# Patient Record
Sex: MALE | Race: ASIAN | NOT HISPANIC OR LATINO | URBAN - METROPOLITAN AREA
[De-identification: names, ages, dates, MRNs, and addresses within clinical notes are randomized per-mention and may not be internally consistent; named-entity substitution may affect disease eponyms.]

---

## 2022-02-08 ENCOUNTER — EMERGENCY (EMERGENCY)
Facility: HOSPITAL | Age: 22
LOS: 1 days | Discharge: HOME | End: 2022-02-08
Attending: EMERGENCY MEDICINE | Admitting: EMERGENCY MEDICINE
Payer: MEDICAID

## 2022-02-08 VITALS
TEMPERATURE: 99 F | OXYGEN SATURATION: 100 % | HEART RATE: 109 BPM | RESPIRATION RATE: 16 BRPM | DIASTOLIC BLOOD PRESSURE: 96 MMHG | SYSTOLIC BLOOD PRESSURE: 141 MMHG

## 2022-02-08 PROCEDURE — 93010 ELECTROCARDIOGRAM REPORT: CPT

## 2022-02-08 PROCEDURE — 99285 EMERGENCY DEPT VISIT HI MDM: CPT | Mod: 25

## 2022-02-08 NOTE — ED ADULT TRIAGE NOTE - CHIEF COMPLAINT QUOTE
pt with lymphoma started on chemo a few days ago. pt c/o non radiating left sided chest pain, palpitations and a headache beginning today. denies sob, cough, fever, n/v. pt appears to be in no distress.

## 2022-02-09 LAB
ALBUMIN SERPL ELPH-MCNC: 4.4 G/DL — SIGNIFICANT CHANGE UP (ref 3.3–5)
ALP SERPL-CCNC: 85 U/L — SIGNIFICANT CHANGE UP (ref 40–120)
ALT FLD-CCNC: 59 U/L — HIGH (ref 4–41)
ANION GAP SERPL CALC-SCNC: 9 MMOL/L — SIGNIFICANT CHANGE UP (ref 7–14)
APPEARANCE UR: CLEAR — SIGNIFICANT CHANGE UP
AST SERPL-CCNC: 45 U/L — HIGH (ref 4–40)
B PERT DNA SPEC QL NAA+PROBE: SIGNIFICANT CHANGE UP
B PERT+PARAPERT DNA PNL SPEC NAA+PROBE: SIGNIFICANT CHANGE UP
BACTERIA # UR AUTO: NEGATIVE — SIGNIFICANT CHANGE UP
BASOPHILS # BLD AUTO: 0.02 K/UL — SIGNIFICANT CHANGE UP (ref 0–0.2)
BASOPHILS NFR BLD AUTO: 0.2 % — SIGNIFICANT CHANGE UP (ref 0–2)
BILIRUB SERPL-MCNC: 0.6 MG/DL — SIGNIFICANT CHANGE UP (ref 0.2–1.2)
BILIRUB UR-MCNC: NEGATIVE — SIGNIFICANT CHANGE UP
BORDETELLA PARAPERTUSSIS (RAPRVP): SIGNIFICANT CHANGE UP
BUN SERPL-MCNC: 13 MG/DL — SIGNIFICANT CHANGE UP (ref 7–23)
C PNEUM DNA SPEC QL NAA+PROBE: SIGNIFICANT CHANGE UP
CALCIUM SERPL-MCNC: 9.5 MG/DL — SIGNIFICANT CHANGE UP (ref 8.4–10.5)
CHLORIDE SERPL-SCNC: 101 MMOL/L — SIGNIFICANT CHANGE UP (ref 98–107)
CK SERPL-CCNC: 34 U/L — SIGNIFICANT CHANGE UP (ref 30–200)
CO2 SERPL-SCNC: 27 MMOL/L — SIGNIFICANT CHANGE UP (ref 22–31)
COLOR SPEC: SIGNIFICANT CHANGE UP
CREAT SERPL-MCNC: 0.78 MG/DL — SIGNIFICANT CHANGE UP (ref 0.5–1.3)
DIFF PNL FLD: ABNORMAL
EOSINOPHIL # BLD AUTO: 0.06 K/UL — SIGNIFICANT CHANGE UP (ref 0–0.5)
EOSINOPHIL NFR BLD AUTO: 0.7 % — SIGNIFICANT CHANGE UP (ref 0–6)
FLUAV SUBTYP SPEC NAA+PROBE: SIGNIFICANT CHANGE UP
FLUBV RNA SPEC QL NAA+PROBE: SIGNIFICANT CHANGE UP
GLUCOSE SERPL-MCNC: 100 MG/DL — HIGH (ref 70–99)
GLUCOSE UR QL: NEGATIVE — SIGNIFICANT CHANGE UP
HADV DNA SPEC QL NAA+PROBE: SIGNIFICANT CHANGE UP
HCOV 229E RNA SPEC QL NAA+PROBE: SIGNIFICANT CHANGE UP
HCOV HKU1 RNA SPEC QL NAA+PROBE: SIGNIFICANT CHANGE UP
HCOV NL63 RNA SPEC QL NAA+PROBE: SIGNIFICANT CHANGE UP
HCOV OC43 RNA SPEC QL NAA+PROBE: SIGNIFICANT CHANGE UP
HCT VFR BLD CALC: 42.4 % — SIGNIFICANT CHANGE UP (ref 39–50)
HGB BLD-MCNC: 14.3 G/DL — SIGNIFICANT CHANGE UP (ref 13–17)
HMPV RNA SPEC QL NAA+PROBE: SIGNIFICANT CHANGE UP
HPIV1 RNA SPEC QL NAA+PROBE: SIGNIFICANT CHANGE UP
HPIV2 RNA SPEC QL NAA+PROBE: SIGNIFICANT CHANGE UP
HPIV3 RNA SPEC QL NAA+PROBE: SIGNIFICANT CHANGE UP
HPIV4 RNA SPEC QL NAA+PROBE: SIGNIFICANT CHANGE UP
IANC: 7.22 K/UL — SIGNIFICANT CHANGE UP (ref 1.5–8.5)
IMM GRANULOCYTES NFR BLD AUTO: 0.4 % — SIGNIFICANT CHANGE UP (ref 0–1.5)
KETONES UR-MCNC: NEGATIVE — SIGNIFICANT CHANGE UP
LDH SERPL L TO P-CCNC: 162 U/L — SIGNIFICANT CHANGE UP (ref 135–225)
LEUKOCYTE ESTERASE UR-ACNC: NEGATIVE — SIGNIFICANT CHANGE UP
LYMPHOCYTES # BLD AUTO: 0.79 K/UL — LOW (ref 1–3.3)
LYMPHOCYTES # BLD AUTO: 9.7 % — LOW (ref 13–44)
M PNEUMO DNA SPEC QL NAA+PROBE: SIGNIFICANT CHANGE UP
MAGNESIUM SERPL-MCNC: 2.1 MG/DL — SIGNIFICANT CHANGE UP (ref 1.6–2.6)
MCHC RBC-ENTMCNC: 28 PG — SIGNIFICANT CHANGE UP (ref 27–34)
MCHC RBC-ENTMCNC: 33.7 GM/DL — SIGNIFICANT CHANGE UP (ref 32–36)
MCV RBC AUTO: 83 FL — SIGNIFICANT CHANGE UP (ref 80–100)
MONOCYTES # BLD AUTO: 0.04 K/UL — SIGNIFICANT CHANGE UP (ref 0–0.9)
MONOCYTES NFR BLD AUTO: 0.5 % — LOW (ref 2–14)
NEUTROPHILS # BLD AUTO: 7.22 K/UL — SIGNIFICANT CHANGE UP (ref 1.8–7.4)
NEUTROPHILS NFR BLD AUTO: 88.5 % — HIGH (ref 43–77)
NITRITE UR-MCNC: NEGATIVE — SIGNIFICANT CHANGE UP
NRBC # BLD: 0 /100 WBCS — SIGNIFICANT CHANGE UP
NRBC # FLD: 0 K/UL — SIGNIFICANT CHANGE UP
PH UR: 6.5 — SIGNIFICANT CHANGE UP (ref 5–8)
PHOSPHATE SERPL-MCNC: 3.7 MG/DL — SIGNIFICANT CHANGE UP (ref 2.5–4.5)
PLATELET # BLD AUTO: 150 K/UL — SIGNIFICANT CHANGE UP (ref 150–400)
POTASSIUM SERPL-MCNC: 4.3 MMOL/L — SIGNIFICANT CHANGE UP (ref 3.5–5.3)
POTASSIUM SERPL-SCNC: 4.3 MMOL/L — SIGNIFICANT CHANGE UP (ref 3.5–5.3)
PROCALCITONIN SERPL-MCNC: 0.04 NG/ML — SIGNIFICANT CHANGE UP (ref 0.02–0.1)
PROT SERPL-MCNC: 7.3 G/DL — SIGNIFICANT CHANGE UP (ref 6–8.3)
PROT UR-MCNC: NEGATIVE — SIGNIFICANT CHANGE UP
RAPID RVP RESULT: SIGNIFICANT CHANGE UP
RBC # BLD: 5.11 M/UL — SIGNIFICANT CHANGE UP (ref 4.2–5.8)
RBC # FLD: 12.3 % — SIGNIFICANT CHANGE UP (ref 10.3–14.5)
RBC CASTS # UR COMP ASSIST: 1 /HPF — SIGNIFICANT CHANGE UP (ref 0–4)
RSV RNA SPEC QL NAA+PROBE: SIGNIFICANT CHANGE UP
RV+EV RNA SPEC QL NAA+PROBE: SIGNIFICANT CHANGE UP
SARS-COV-2 RNA SPEC QL NAA+PROBE: SIGNIFICANT CHANGE UP
SODIUM SERPL-SCNC: 137 MMOL/L — SIGNIFICANT CHANGE UP (ref 135–145)
SP GR SPEC: 1.01 — SIGNIFICANT CHANGE UP (ref 1–1.05)
TROPONIN T, HIGH SENSITIVITY RESULT: <6 NG/L — SIGNIFICANT CHANGE UP
URATE SERPL-MCNC: 3.4 MG/DL — SIGNIFICANT CHANGE UP (ref 3.4–8.8)
UROBILINOGEN FLD QL: SIGNIFICANT CHANGE UP
WBC # BLD: 8.16 K/UL — SIGNIFICANT CHANGE UP (ref 3.8–10.5)
WBC # FLD AUTO: 8.16 K/UL — SIGNIFICANT CHANGE UP (ref 3.8–10.5)
WBC UR QL: 1 /HPF — SIGNIFICANT CHANGE UP (ref 0–5)

## 2022-02-09 PROCEDURE — 71046 X-RAY EXAM CHEST 2 VIEWS: CPT | Mod: 26

## 2022-02-09 PROCEDURE — 99220: CPT | Mod: 25

## 2022-02-09 PROCEDURE — 99284 EMERGENCY DEPT VISIT MOD MDM: CPT

## 2022-02-09 PROCEDURE — 70450 CT HEAD/BRAIN W/O DYE: CPT | Mod: 26,MA

## 2022-02-09 RX ORDER — SODIUM CHLORIDE 9 MG/ML
1000 INJECTION INTRAMUSCULAR; INTRAVENOUS; SUBCUTANEOUS ONCE
Refills: 0 | Status: COMPLETED | OUTPATIENT
Start: 2022-02-09 | End: 2022-02-09

## 2022-02-09 RX ORDER — ACETAMINOPHEN 500 MG
975 TABLET ORAL ONCE
Refills: 0 | Status: COMPLETED | OUTPATIENT
Start: 2022-02-09 | End: 2022-02-09

## 2022-02-09 RX ORDER — KETOROLAC TROMETHAMINE 30 MG/ML
15 SYRINGE (ML) INJECTION ONCE
Refills: 0 | Status: DISCONTINUED | OUTPATIENT
Start: 2022-02-09 | End: 2022-02-09

## 2022-02-09 RX ORDER — DIPHENHYDRAMINE HCL 50 MG
25 CAPSULE ORAL ONCE
Refills: 0 | Status: COMPLETED | OUTPATIENT
Start: 2022-02-09 | End: 2022-02-09

## 2022-02-09 RX ORDER — MAGNESIUM SULFATE 500 MG/ML
1 VIAL (ML) INJECTION ONCE
Refills: 0 | Status: COMPLETED | OUTPATIENT
Start: 2022-02-09 | End: 2022-02-09

## 2022-02-09 RX ORDER — METOCLOPRAMIDE HCL 10 MG
10 TABLET ORAL ONCE
Refills: 0 | Status: COMPLETED | OUTPATIENT
Start: 2022-02-09 | End: 2022-02-09

## 2022-02-09 RX ORDER — DIAZEPAM 5 MG
2 TABLET ORAL ONCE
Refills: 0 | Status: DISCONTINUED | OUTPATIENT
Start: 2022-02-09 | End: 2022-02-09

## 2022-02-09 RX ADMIN — Medication 25 MILLIGRAM(S): at 09:23

## 2022-02-09 RX ADMIN — SODIUM CHLORIDE 1000 MILLILITER(S): 9 INJECTION INTRAMUSCULAR; INTRAVENOUS; SUBCUTANEOUS at 02:10

## 2022-02-09 RX ADMIN — Medication 15 MILLIGRAM(S): at 07:25

## 2022-02-09 RX ADMIN — Medication 10 MILLIGRAM(S): at 09:23

## 2022-02-09 RX ADMIN — Medication 15 MILLIGRAM(S): at 03:15

## 2022-02-09 RX ADMIN — Medication 975 MILLIGRAM(S): at 05:24

## 2022-02-09 RX ADMIN — Medication 10 MILLIGRAM(S): at 05:24

## 2022-02-09 RX ADMIN — Medication 15 MILLIGRAM(S): at 14:44

## 2022-02-09 RX ADMIN — Medication 975 MILLIGRAM(S): at 06:30

## 2022-02-09 RX ADMIN — SODIUM CHLORIDE 1000 MILLILITER(S): 9 INJECTION INTRAMUSCULAR; INTRAVENOUS; SUBCUTANEOUS at 09:23

## 2022-02-09 RX ADMIN — Medication 15 MILLIGRAM(S): at 19:50

## 2022-02-09 RX ADMIN — Medication 10 MILLIGRAM(S): at 18:21

## 2022-02-09 RX ADMIN — Medication 2 MILLIGRAM(S): at 07:25

## 2022-02-09 RX ADMIN — Medication 15 MILLIGRAM(S): at 02:24

## 2022-02-09 RX ADMIN — Medication 100 GRAM(S): at 06:42

## 2022-02-09 RX ADMIN — Medication 100 GRAM(S): at 09:22

## 2022-02-09 RX ADMIN — Medication 15 MILLIGRAM(S): at 14:59

## 2022-02-09 RX ADMIN — Medication 15 MILLIGRAM(S): at 19:20

## 2022-02-09 NOTE — ED PROVIDER NOTE - NSFOLLOWUPINSTRUCTIONS_ED_ALL_ED_FT
You came to the hospital for severe headache. You obtained some imaging and lab work that rule out an infection or a condition called tumor lysis syndrome. Your headache was controlled with some NSAIDs. You can continue to take NSAIDs as necessary however you should stay within the recommended amounts. Please follow up with your PCP and oncologist who should recheck your liver enzymes.   Please return to the ED if the symptoms become uncontrolled or you develop any other concerning symptoms. You came to the hospital for severe headache. Your Head CT, blood work, EKG, and urine test were reassuring.    You may take Tylenol and/or Motrin over the counter per label instructions as needed for pain.    Please follow up with your oncology within 1 week to discuss your ER visit.    Please return to the ED if the symptoms worsen or you develop any other concerning symptoms.

## 2022-02-09 NOTE — ED PROVIDER NOTE - PROGRESS NOTE DETAILS
Sarwta, PGY3 - received pt as sign-out, pending CTH read. Pt reevaluated, states headache initially 10/10, improved with Toradol but starting to come back, currently 7/10. Will give Tylenol (last had 14hrs ago) and Reglan, reeval ALONDRA Horn: Case endorsed. Pt reassessed, states headache is 6/10. Will cdu for intractable headache, and MRI.

## 2022-02-09 NOTE — CONSULT NOTE ADULT - ATTENDING COMMENTS
22 y/o man with recent diagnosis of Hodgkin's lymphoma and recently started on Chemo p/w new onset headache. No findings on his exam. HE received magnesium, tylenol, toradol, reglan, and benadryl for pain with some improvement. Discussed with his oncologist he started having these headaches after getting chemo. They tried to control in the center but he came in to the ED .   Could be side effect of chemo vs new onset of migraine. Also in ddx is carcinomatous meningitis but unlikley as Hodgkin's rarely spreads to the brain.    Agree with CDU admission for MRI w/wo  for pain: IV tylenol 1g, IV fluids, Toradol 30mg IV q6 PRN

## 2022-02-09 NOTE — ED PROVIDER NOTE - PHYSICAL EXAMINATION
PHYSICAL EXAM  GENERAL: NAD, lying comfortably in bed   HEAD:  Atraumatic, Normocephalic  EYES: EOMI b/l, PERRLA b/l, conjunctiva and sclera clear  NECK: Supple, No JVD, No LAD   CHEST/LUNG: Clear to auscultation bilaterally; No wheeze or ronchi  HEART: Regular rate and rhythm; S1 and S2 present, No murmurs, rubs, or gallops  ABDOMEN: Soft, Nontender, Nondistended; Bowel sounds present  EXTREMITIES:  2+ Peripheral Pulses, No clubbing, cyanosis, or edema  NEURO: AAOx3, non-focal, no nuchal rigidity  SKIN: No rashes or lesions  right anterior medport without overt signs of infection

## 2022-02-09 NOTE — ED ADULT NURSE NOTE - OBJECTIVE STATEMENT
pt received to room 11, a&ox 4 and ambulatory , pmh of lymphoma and recently started on Chemo, p/w headache x 1 day. Described HA as worsening. Denies photophobia, blurry vision. Verbalized chest pain radiating to left arm.  Pt breathing even and unlabored on room air. NSR on cardiac monitor. Denies fever, chills, cough, SOB, palpitations, dizziness. CT pending. endorse to covering RN. Stretcher in lowest position, wheels locked, appropriate side rails in place, call bell in reach.

## 2022-02-09 NOTE — ED PROVIDER NOTE - CLINICAL SUMMARY MEDICAL DECISION MAKING FREE TEXT BOX
22 yo M with Lymphoma recently started chemo p/f 1 day of severe headache with bodyaches pending workup for possible TLS or infectious source and control of headache 22 yo M with Lymphoma recently started chemo p/f 1 day of severe headache with body aches with negative workup for TLS or infectious source and headache now controlled with negative neuro exam and non acute imaging

## 2022-02-09 NOTE — CONSULT NOTE ADULT - ASSESSMENT
Demond Bear is a 21 year old RH male with a past medical history of lymphoma (unknown type and patient unsure) who was recently started on chemotherapy (unknown medication and patient does not know which kind) 3 days ago who is presenting with a headache.     Impression: holocephalic headache with no lateralizing deficits in the setting of recent started chemotherapy more likely secondary to medication effect than an intracranial process    Recs:  [] headache control with Toradol 30mg IV, Reglan 10mg IV, and Benadryl 25mg IV all to be administered at the same time  [] if using magnesium please use Mg 2g IV  [] can trial Solumedrol 250mg IV once  [] encourage oral hydration and provide IV hydration  [] will determine utility of further imaging upon reevaluating the patient after medications  [] upon stability can follow up with outpatient neurology at [address] [place] [address] ([phone])    Case to be seen and discussed with neurology attending, Dr. Urena.

## 2022-02-09 NOTE — ED CDU PROVIDER INITIAL DAY NOTE - ATTENDING CONTRIBUTION TO CARE
The patient is a 21y Male who has a past medical and surgery history of Lymphoma (?type)  PTED with   Vital Signs Stable Afebrile  PE: as described; my additions and exceptions are noted in the chart    DATA:  LAB:                       14.3   8.16  )-----------( 150      ( 2022 02:35 )             42.4   Mean Cell Volume: 83.0 fL (22 @ 02:35)  Auto Neutrophil %: 88.5 % (22 @ 02:35)  Auto Eosinophil %: 0.7 % (22 @ 02:35)      137  |  101  |  13  ----------------------------<  100<H>  4.3   |  27  |  0.78    Ca    9.5      2022 02:35  Phos  3.7       Mg     2.10         TPro  7.3  /  Alb  4.4  /  TBili  0.6  /  DBili  x   /  AST  45<H>  /  ALT  59<H>  /  AlkPhos  85  02   CARDIAC MARKERS ( 2022 02:35 )  x     / x     / 34 U/L / x     / x        Troponin T, High Sensitivity Result: <6 ng/L (22 @ 02:35)  Urinalysis Basic - ( 2022 02:35 )  Color: Light Yellow / Appearance: Clear / S.011 / pH: x  Gluc: x / Ketone: Negative  / Bili: Negative / Urobili: <2 mg/dL   Blood: x / Protein: Negative / Nitrite: Negative   Leuk Esterase: Negative / RBC: 1 /HPF / WBC 1 /HPF   Sq Epi: x / Non Sq Epi: x / Bacteria: Negative    CT IMPRESSION: NO bleed mass shift; recc contrast enhanced MRI for > s/specificity for occult metastatic disease or neoplasm.    IMPRESSION/RISK:  Dx=Pt with persistent HA     Consideration include: Drug induced headache vs pachymeningeal infiltration of meninges possible with some lymphoma types   Plan  Recs:  Pt received Toradol 30mg IV, Reglan 10mg IV, and Benadryl 25mg IV magnesium with best response being 6/10   will admit to CDU for MRI w/wo neuro followup and meds until pain controlled .

## 2022-02-09 NOTE — CONSULT NOTE ADULT - SUBJECTIVE AND OBJECTIVE BOX
HPI:  Demond Bear is a 21 year old RH male with a past medical history of lymphoma (unknown type and patient unsure) who was recently started on chemotherapy (unknown medication and patient does not know which kind) 3 days ago who is presenting with a headache. At baseline, the patient is ambulatory without assistive devices and is oriented to person, place, time. Stated he had a biopsy done prior to chemotherapy but is unsure of the stage. Stated that he had his first dose of chemotherapy 3 days ago through an infusion in a medport. Yesterday, the patient developed a holocephalic headache which he describes as diffuse, pressure-like and maximal 10/10. States headache is continuous. There is no positional component to his headache and not associated with photophobia, phonophobia, vertigo, lightheadedness, nausea, vomiting, visual deficits, auditory changes. States he has some neck pain as well but no rigidity or stiffness. Denies fevers, chills, numbness, tingling, weakness, gait instability. No recent falls, head trauma, recent illnesses, sick contacts. At home stated he had been taking Tylenol for his headaches with minimal effect. Here has received toradol, Tylenol, Reglan, Mg 1g with minimal effect improving his headache to at best 7/10. Also notes that he feels that he has some swelling of his tongue and throat. Stated that he has never had a headache like this in the past.     NIHSS: 0  MRS: 0    REVIEW OF SYSTEMS    A 10-system ROS was performed and is negative except for those items noted above and/or in the HPI.    PAST MEDICAL & SURGICAL HISTORY:  Lymphoma    No significant past surgical history    FAMILY HISTORY:  No pertinent family history in first degree relatives    SOCIAL HISTORY:   T/E/D: no reported smoking, alcohol, illicit drugs  Lives with family    ALLERGIES/INTOLERANCES:  Allergies  No Known Allergies    Intolerances    VITALS & EXAMINATION:  Vital Signs Last 24 Hrs  T(C): 36.7 (2022 06:01), Max: 37.1 (2022 23:16)  T(F): 98 (2022 06:01), Max: 98.7 (2022 23:16)  HR: 72 (2022 06:01) (72 - 109)  BP: 122/75 (2022 06:01) (122/75 - 145/104)  BP(mean): --  RR: 16 (2022 06:01) (16 - 16)  SpO2: 98% (2022 06:01) (98% - 100%)    General:  Constitutional: Appears stated age.  Head: Normocephalic & atraumatic.    Neurological (>12):  MS: Awake, alert, oriented to person, place, situation, time. Normal affect. Follows all commands.    Language: Speech is clear, fluent with good comprehension.    CNs: PERRL (R = 3mm, L = 3mm). VF intact in all 4 quadrants. EOMI no nystagmus, no diplopia, no pain with eye movements. V1-3 intact to LT, well developed masseter muscles b/l. No facial asymmetry b/l, full eye closure strength b/l. Symmetric palate elevation in midline. Shoulder shrug intact b/l. Tongue midline, normal movements, no atrophy.    Motor: Normal muscle bulk & tone. No noticeable tremor or seizure. No pronator drift.              Deltoid	Biceps	Triceps	   R	5	5	5	5	  L	5	5	5	5	    	H-Flex	H-Ext	K-Flex	K-Ext	D-Flex	P-Flex  R	5	5	5	5	5	5		   L	5	5	5	5	5	5		     Sensation: Intact to LT b/l throughout.     Reflexes:              Biceps(C5)       BR(C6)     Triceps(C7)               Patellar(L4)    Achilles(S1)     R	2	          2	             2		        2		    2		  L	2	          2	             2		        2		    2		    Coordination:  No dysmetria to FTN    Gait: No postural instability. Normal stance and gait.     LABORATORY:  CBC                       14.3   8.16  )-----------( 150      ( 2022 02:35 )             42.4     Chem 02-    137  |  101  |  13  ----------------------------<  100<H>  4.3   |  27  |  0.78    Ca    9.5      2022 02:35  Phos  3.7     02-09  Mg     2.10     -09    TPro  7.3  /  Alb  4.4  /  TBili  0.6  /  DBili  x   /  AST  45<H>  /  ALT  59<H>  /  AlkPhos  85      LFTs LIVER FUNCTIONS - ( 2022 02:35 )  Alb: 4.4 g/dL / Pro: 7.3 g/dL / ALK PHOS: 85 U/L / ALT: 59 U/L / AST: 45 U/L / GGT: x           Cardiac enzymes CARDIAC MARKERS ( 2022 02:35 )  x     / x     / 34 U/L / x     / x        U/A Urinalysis Basic - ( 2022 02:35 )    Color: Light Yellow / Appearance: Clear / S.011 / pH: x  Gluc: x / Ketone: Negative  / Bili: Negative / Urobili: <2 mg/dL   Blood: x / Protein: Negative / Nitrite: Negative   Leuk Esterase: Negative / RBC: 1 /HPF / WBC 1 /HPF   Sq Epi: x / Non Sq Epi: x / Bacteria: Negative    STUDIES & IMAGING:    Radiology (XR, CT, MR, U/S, TTE/JUNIOR):    CT Head No Cont (22 @ 04:31)  IMPRESSION:    No acute intracranial bleeding.     HPI:  Demond Bear is a 21 year old RH male with a past medical history of lymphoma (unknown type and patient unsure) who was recently started on chemotherapy (unknown medication and patient does not know which kind) 3 days ago who is presenting with a headache. At baseline, the patient is ambulatory without assistive devices and is oriented to person, place, time. Stated he had a biopsy done prior to chemotherapy but is unsure of the stage. Stated that he had his first dose of chemotherapy 3 days ago through an infusion in a medport. Yesterday, the patient developed a holocephalic headache which he describes as diffuse, pressure-like and maximal 10/10. States headache is continuous. There is no positional component to his headache and not associated with photophobia, phonophobia, vertigo, lightheadedness, nausea, vomiting, visual deficits, auditory changes. States he has some neck pain as well but no rigidity or stiffness. Denies fevers, chills, numbness, tingling, weakness, gait instability. No recent falls, head trauma, recent illnesses, sick contacts. At home stated he had been taking Tylenol for his headaches with minimal effect. Here has received toradol, Tylenol, Reglan, Mg 1g with minimal effect improving his headache to at best 7/10. Also notes that he feels that he has some swelling of his tongue and throat. Stated that he has never had a headache like this in the past.     NIHSS: 0  MRS: 0    REVIEW OF SYSTEMS    A 10-system ROS was performed and is negative except for those items noted above and/or in the HPI.    PAST MEDICAL & SURGICAL HISTORY:  Lymphoma    No significant past surgical history    FAMILY HISTORY:  No pertinent neurologic family history in first degree relatives    SOCIAL HISTORY:   T/E/D: no reported smoking, alcohol, illicit drugs  Lives with family    ALLERGIES/INTOLERANCES:  Allergies  No Known Allergies    Intolerances    VITALS & EXAMINATION:  Vital Signs Last 24 Hrs  T(C): 36.7 (2022 06:01), Max: 37.1 (2022 23:16)  T(F): 98 (2022 06:01), Max: 98.7 (2022 23:16)  HR: 72 (2022 06:01) (72 - 109)  BP: 122/75 (2022 06:01) (122/75 - 145/104)  BP(mean): --  RR: 16 (2022 06:01) (16 - 16)  SpO2: 98% (2022 06:01) (98% - 100%)    General:  Constitutional: Appears stated age.  Head: Normocephalic & atraumatic  Eyes: clear conjunctiva  Resp: breathing comfortably  Skin: no rash, nondiaphoretic    Neurological (>12):  MS: Awake, alert, oriented to person, place, situation, time. Normal affect. Follows all commands.    Language: Speech is clear, fluent with good comprehension.    CNs: PERRL (R = 3mm, L = 3mm). VF intact in all 4 quadrants. EOMI no nystagmus, no diplopia, no pain with eye movements. V1-3 intact to LT, well developed masseter muscles b/l. No facial asymmetry b/l, full eye closure strength b/l. Symmetric palate elevation in midline. Shoulder shrug intact b/l. Tongue midline, normal movements, no atrophy.    Motor: Normal muscle bulk & tone. No noticeable tremor or seizure. No pronator drift.              Deltoid	Biceps	Triceps	   R	5	5	5	5	  L	5	5	5	5	    	H-Flex	H-Ext	K-Flex	K-Ext	D-Flex	P-Flex  R	5	5	5	5	5	5		   L	5	5	5	5	5	5		     Sensation: Intact to LT b/l throughout.     Reflexes:              Biceps(C5)       BR(C6)     Triceps(C7)               Patellar(L4)    Achilles(S1)     R	2	          2	             2		        2		    2		  L	2	          2	             2		        2		    2		    Coordination:  No dysmetria to FTN    Gait: No postural instability. Normal stance and gait.     LABORATORY:  CBC                       14.3   8.16  )-----------( 150      ( 2022 02:35 )             42.4     Chem 02-09    137  |  101  |  13  ----------------------------<  100<H>  4.3   |  27  |  0.78    Ca    9.5      2022 02:35  Phos  3.7     02-09  Mg     2.10     02-09    TPro  7.3  /  Alb  4.4  /  TBili  0.6  /  DBili  x   /  AST  45<H>  /  ALT  59<H>  /  AlkPhos  85      LFTs LIVER FUNCTIONS - ( 2022 02:35 )  Alb: 4.4 g/dL / Pro: 7.3 g/dL / ALK PHOS: 85 U/L / ALT: 59 U/L / AST: 45 U/L / GGT: x           Cardiac enzymes CARDIAC MARKERS ( 2022 02:35 )  x     / x     / 34 U/L / x     / x        U/A Urinalysis Basic - ( 2022 02:35 )    Color: Light Yellow / Appearance: Clear / S.011 / pH: x  Gluc: x / Ketone: Negative  / Bili: Negative / Urobili: <2 mg/dL   Blood: x / Protein: Negative / Nitrite: Negative   Leuk Esterase: Negative / RBC: 1 /HPF / WBC 1 /HPF   Sq Epi: x / Non Sq Epi: x / Bacteria: Negative    STUDIES & IMAGING:    Radiology (XR, CT, MR, U/S, TTE/JUNIOR):    CT Head No Cont (22 @ 04:31)  IMPRESSION:    No acute intracranial bleeding.

## 2022-02-09 NOTE — ED PROVIDER NOTE - PLAN OF CARE
20 yo M with Lymphoma recently started chemo p/f 1 day of severe headache with bodyaches pending workup for possible TLS or infectious source and control of headache 20 yo M with Lymphoma recently started chemo p/f 1 day of severe headache with body aches with negative workup for TLS or infectious source and headache now controlled with negative neuro exam and non acute imaging

## 2022-02-09 NOTE — ED PROVIDER NOTE - PATIENT PORTAL LINK FT
You can access the FollowMyHealth Patient Portal offered by [facility] by registering at the following website: [email or web address]. By joining [employer]’s FollowMyHealth portal, you will also be able to view your health information using other applications (apps) compatible with our system.

## 2022-02-09 NOTE — ED PROVIDER NOTE - ATTENDING CONTRIBUTION TO CARE
Afebrile. Awake and Alert. Lungs CTA. Heart RRR. Abdomen soft NTND. CN II-XII grossly intact. Moves all extremities without lateralization.    Body aches after initiating chemotherapy for lymphoma  Headache, non-focal neuro exam  r/o tumor lysis syndrome  r/o infections

## 2022-02-09 NOTE — ED PROVIDER NOTE - OBJECTIVE STATEMENT
20 yo M with lymphoma recent started on chemo 3 days ago via [facility] p/f one day worth of diffuse headache worsening to 10/10 without photophobia/phonophobia/nausea/vomiting/blurry vision/nuchal rigidity/fevers/chills. Patient tried tylenol as an outpatient but was not improving symptoms. Patient chest pain is more with body aches and worsens with exertion due to general weakness. Patient is covid vaccinated and has been avoiding any possible sick contacts. Denies cough, dysuria, diarrhea, leg swelling, orthopnea, [place] or PND.  PCP is Jose Juan Spaulding/Onc is Kathy Erazo

## 2022-02-09 NOTE — ED PROVIDER NOTE - NS ED ROS FT
Review of Systems:  Constitutional: No fever, No weight loss, good appetite/po intake  Head:    Eyes: No blurry vision, No diplopia  Neuro: No tremors, No muscle weakness   Cardiovascular: No palpitations  Respiratory: No SOB, No cough  GI: No nausea, No vomiting, No diarrhea  : No dysuria, No hematuria  Skin: No rash  MSK: No joint pain   Psych: No depression

## 2022-02-09 NOTE — ED CDU PROVIDER INITIAL DAY NOTE - SKIN, MLM
micu AT [facility], RN  06/03/20 1954
Skin normal color for race, warm, dry and intact. No evidence of rash.

## 2022-02-09 NOTE — ED ADULT NURSE REASSESSMENT NOTE - NS ED NURSE REASSESS COMMENT FT1
pt sleeping, responsive to touch and verbal stimuli. Verbalizing partial relief in h/a at this time. Denies any other complaints. RR even and unlabored. Awaiting CDU dispo.
received report from CARROL Bronw. Pt AxOx4 resting with RR even and unlabored. Pt endorsing h/a 8/10 at this time. Denies visual changes, dizziness, N/V, CP, SOB. VSS and as noted. Pt purposefully moving all extremities. Awaiting dispo status to CDU. Will continue to monitor.

## 2022-02-09 NOTE — ED PROVIDER NOTE - CARE PLAN
Assessment and plan of treatment:	20 yo M with Lymphoma recently started chemo p/f 1 day of severe headache with bodyaches pending workup for possible TLS or infectious source and control of headache   Assessment and plan of treatment:	22 yo M with Lymphoma recently started chemo p/f 1 day of severe headache with body aches with negative workup for TLS or infectious source and headache now controlled with negative neuro exam and non acute imaging   Principal Discharge DX:	Headache  Assessment and plan of treatment:	20 yo M with Lymphoma recently started chemo p/f 1 day of severe headache with body aches with negative workup for TLS or infectious source and headache now controlled with negative neuro exam and non acute imaging   1

## 2022-02-10 VITALS
DIASTOLIC BLOOD PRESSURE: 79 MMHG | TEMPERATURE: 98 F | SYSTOLIC BLOOD PRESSURE: 131 MMHG | HEART RATE: 94 BPM | OXYGEN SATURATION: 100 % | RESPIRATION RATE: 16 BRPM

## 2022-02-10 LAB
CULTURE RESULTS: NO GROWTH — SIGNIFICANT CHANGE UP
SPECIMEN SOURCE: SIGNIFICANT CHANGE UP

## 2022-02-10 PROCEDURE — 70553 MRI BRAIN STEM W/O & W/DYE: CPT | Mod: 26,MA

## 2022-02-10 PROCEDURE — 99217: CPT

## 2022-02-10 RX ORDER — FAMOTIDINE 10 MG/ML
20 INJECTION INTRAVENOUS ONCE
Refills: 0 | Status: COMPLETED | OUTPATIENT
Start: 2022-02-10 | End: 2022-02-10

## 2022-02-10 RX ADMIN — FAMOTIDINE 20 MILLIGRAM(S): 10 INJECTION INTRAVENOUS at 06:49

## 2022-02-10 RX ADMIN — Medication 30 MILLILITER(S): at 00:33

## 2022-02-10 NOTE — ED CDU PROVIDER SUBSEQUENT DAY NOTE - ATTENDING CONTRIBUTION TO CARE
I performed a face-to-face evaluation of the patient and performed a history and physical examination. I agree with the history and physical examination. If this was a PA visit, I personally saw the patient with the PA and performed a substantive portion of the visit including all aspects of the medical decision making.    Lymphoma. HA. No trauma/F/meningismus. Lab results unremarkable. CT brain unremarkable. In CDU for MRI and pain control. Feels better. Awaiting MRI results.

## 2022-02-10 NOTE — ED CDU PROVIDER SUBSEQUENT DAY NOTE - HISTORY
22 y/o male pmh lymphoma w/ recent new chemo x3 days ago c/o headache. Pt admits to new onset headache. Pt had normal labs and normal head CT and was seen and eval by neuro. Headache was controlled at first but then pain returned, pt to be placed in CDU for pain control and MRI w/wo contrast.  Headache controlled overnight. Pending MRI results

## 2022-02-10 NOTE — ED CDU PROVIDER SUBSEQUENT DAY NOTE - PROGRESS NOTE DETAILS
Pt reassessed this morning, headache resolved. Did have some epigastric discomfort earlier, now improved w Pepcid. MRI head w/ plagiocephaly, otherwise unremarkable. Spoke w/ neuro, pt ok for discharge. Pt has an appt for chemo at 11am today. Will naye.

## 2022-02-10 NOTE — ED CDU PROVIDER DISPOSITION NOTE - PATIENT PORTAL LINK FT
You can access the FollowMyHealth Patient Portal offered by [facility] by registering at the following website: [email or web address]. By joining [employer]’s FollowMyHealth portal, you will also be able to view your health information using other applications (apps) compatible with our system.

## 2022-02-10 NOTE — ED CDU PROVIDER DISPOSITION NOTE - CLINICAL COURSE
22 y/o male pmh lymphoma w/ recent new chemo x3 days ago c/o headache. Pt had normal labs and normal head CT in the main ED and was seen and eval by neuro. Headache was controlled at first but then pain returned, placed in CDU for pain control and MRI w/wo contrast. Pt reassessed this morning, headache resolved. Did have some epigastric discomfort earlier, now improved w Pepcid. MRI head w/ plagiocephaly, otherwise unremarkable. Spoke w/ neuro, pt ok for discharge. Pt has an appt for chemo at 11am today. Will naye.